# Patient Record
Sex: MALE | Race: OTHER | NOT HISPANIC OR LATINO
[De-identification: names, ages, dates, MRNs, and addresses within clinical notes are randomized per-mention and may not be internally consistent; named-entity substitution may affect disease eponyms.]

---

## 2019-05-29 PROBLEM — Z00.00 ENCOUNTER FOR PREVENTIVE HEALTH EXAMINATION: Status: ACTIVE | Noted: 2019-05-29

## 2019-06-13 ENCOUNTER — RECORD ABSTRACTING (OUTPATIENT)
Age: 84
End: 2019-06-13

## 2019-06-13 DIAGNOSIS — Z92.89 PERSONAL HISTORY OF OTHER MEDICAL TREATMENT: ICD-10-CM

## 2019-06-13 DIAGNOSIS — Z87.891 PERSONAL HISTORY OF NICOTINE DEPENDENCE: ICD-10-CM

## 2019-06-18 ENCOUNTER — APPOINTMENT (OUTPATIENT)
Dept: CARDIOLOGY | Facility: CLINIC | Age: 84
End: 2019-06-18

## 2019-08-09 ENCOUNTER — APPOINTMENT (OUTPATIENT)
Dept: CARDIOLOGY | Facility: CLINIC | Age: 84
End: 2019-08-09
Payer: MEDICARE

## 2019-08-09 VITALS
SYSTOLIC BLOOD PRESSURE: 164 MMHG | HEIGHT: 66 IN | HEART RATE: 77 BPM | BODY MASS INDEX: 25.23 KG/M2 | WEIGHT: 157 LBS | DIASTOLIC BLOOD PRESSURE: 78 MMHG

## 2019-08-09 DIAGNOSIS — Z86.73 PERSONAL HISTORY OF TRANSIENT ISCHEMIC ATTACK (TIA), AND CEREBRAL INFARCTION W/OUT RESIDUAL DEFICITS: ICD-10-CM

## 2019-08-09 PROCEDURE — 99214 OFFICE O/P EST MOD 30 MIN: CPT

## 2019-08-09 PROCEDURE — 93000 ELECTROCARDIOGRAM COMPLETE: CPT

## 2019-08-09 NOTE — ASSESSMENT
[FreeTextEntry1] : s/p CVA - recovering, undergoing PT\par CAD s/p CABG.\par S/p PCI of RCA (REUBEN).\par COPD\par

## 2019-08-09 NOTE — REVIEW OF SYSTEMS
[Feeling Fatigued] : feeling fatigued [Eyeglasses] : currently wearing eyeglasses [Urinary Frequency] : urinary frequency [Joint Pain] : joint pain [see HPI] : see HPI [Negative] : Heme/Lymph

## 2019-08-09 NOTE — REASON FOR VISIT
[Coronary Artery Disease] : coronary artery disease [Follow-Up - From Hospitalization] : follow-up of a recent hospitalization for

## 2019-08-09 NOTE — HISTORY OF PRESENT ILLNESS
[FreeTextEntry1] : The patient presents for a follow-up. he was recently admitted to a hospital in [place] with a CVA. No TPA was given. Patient was treated with increased dose of ASA, was also given Coreg 6.25 mg. He was dehydrated on labs, so HCTZ was held. BP is still uncontrolled. No chest pain since the last visit.  He had a stress test last year, which was negative for ischemia. Following-up with Dr. Sawyer for bladder CA.\par \par CAD, s/p CABG, PCI\par HTN\par CVA\par dyslipidemia\par COPD\par asbestosis\par PCI of RCA with REUBEN\par Bladder CA

## 2019-08-09 NOTE — DISCUSSION/SUMMARY
[FreeTextEntry1] : BP - uncontrolled.\par Add amlodipine. \par ECG unchanged.\par Stress test noted. Echo in the hospital - normal EF.\par C/w current medications otherwise. If more edema, will restart HCTZ. \par F/u with pulmonary for COPD, asbestosis.\par F/u with Dr. Sawyer for bladder CA management.\par F/u with Dr. Burciaga.\par Return in 3 weeks or if worsening symptoms.

## 2019-08-09 NOTE — PHYSICAL EXAM
[General Appearance - Well Developed] : well developed [General Appearance - In No Acute Distress] : no acute distress [General Appearance - Well Nourished] : well nourished [Normal Conjunctiva] : the conjunctiva exhibited no abnormalities [Normal Oral Mucosa] : normal oral mucosa [Normal Oropharynx] : normal oropharynx [Respiration, Rhythm And Depth] : normal respiratory rhythm and effort [Heart Rate And Rhythm] : heart rate and rhythm were normal [Auscultation Breath Sounds / Voice Sounds] : lungs were clear to auscultation bilaterally [Arterial Pulses Normal] : the arterial pulses were normal [Heart Sounds] : normal S1 and S2 [Bowel Sounds] : normal bowel sounds [Abdomen Soft] : soft [Abdomen Tenderness] : non-tender [FreeTextEntry1] : walks with a cane [Nail Clubbing] : no clubbing of the fingernails [Petechial Hemorrhages (___cm)] : no petechial hemorrhages [Cyanosis, Localized] : no localized cyanosis [] : no rash [Skin Color & Pigmentation] : normal skin color and pigmentation [Affect] : the affect was normal [Oriented To Time, Place, And Person] : oriented to person, place, and time

## 2019-08-16 ENCOUNTER — APPOINTMENT (OUTPATIENT)
Dept: CARDIOLOGY | Facility: CLINIC | Age: 84
End: 2019-08-16
Payer: MEDICARE

## 2019-08-16 VITALS
WEIGHT: 152 LBS | SYSTOLIC BLOOD PRESSURE: 130 MMHG | HEIGHT: 68 IN | BODY MASS INDEX: 23.04 KG/M2 | DIASTOLIC BLOOD PRESSURE: 54 MMHG | HEART RATE: 51 BPM

## 2019-08-16 PROCEDURE — 99214 OFFICE O/P EST MOD 30 MIN: CPT

## 2019-08-16 PROCEDURE — 93000 ELECTROCARDIOGRAM COMPLETE: CPT

## 2019-08-16 RX ORDER — RANOLAZINE 500 MG/1
500 TABLET, FILM COATED, EXTENDED RELEASE ORAL DAILY
Refills: 0 | Status: ACTIVE | COMMUNITY

## 2019-08-16 NOTE — PHYSICAL EXAM
[General Appearance - Well Nourished] : well nourished [General Appearance - Well Developed] : well developed [General Appearance - In No Acute Distress] : no acute distress [Normal Conjunctiva] : the conjunctiva exhibited no abnormalities [Normal Oral Mucosa] : normal oral mucosa [Normal Oropharynx] : normal oropharynx [Respiration, Rhythm And Depth] : normal respiratory rhythm and effort [Auscultation Breath Sounds / Voice Sounds] : lungs were clear to auscultation bilaterally [Heart Rate And Rhythm] : heart rate and rhythm were normal [Arterial Pulses Normal] : the arterial pulses were normal [Heart Sounds] : normal S1 and S2 [Bowel Sounds] : normal bowel sounds [Abdomen Soft] : soft [Abdomen Tenderness] : non-tender [FreeTextEntry1] : walks with a cane [Nail Clubbing] : no clubbing of the fingernails [Cyanosis, Localized] : no localized cyanosis [Petechial Hemorrhages (___cm)] : no petechial hemorrhages [] : no rash [Skin Color & Pigmentation] : normal skin color and pigmentation [Oriented To Time, Place, And Person] : oriented to person, place, and time [Affect] : the affect was normal

## 2019-08-16 NOTE — HISTORY OF PRESENT ILLNESS
[FreeTextEntry1] : The patient presents for a follow-up. His BP is still elevtaed, despite increase in amlodipine. he also reports worsening leg edema. He was recently admitted to a hospital in [place] with a CVA. No TPA was given. Patient was treated with increased dose of ASA, was also given Coreg 6.25 mg. He was dehydrated on labs, so HCTZ was held. BP is still uncontrolled. No chest pain since the last visit.  He had a stress test last year, which was negative for ischemia. Following-up with Dr. Sawyer for bladder CA.\par \par CAD, s/p CABG, PCI\par HTN\par CVA\par dyslipidemia\par COPD\par asbestosis\par PCI of RCA with REUBEN\par Bladder CA

## 2019-08-16 NOTE — DISCUSSION/SUMMARY
[FreeTextEntry1] : BP - uncontrolled.\par d/c amlodipine. Increase lisinopril  to 20/12.5 BID (total dose of 40/25)\par Will use PRN hydralazine if SBP above 150\par ECG unchanged.\par Stress test noted. Echo in the hospital - normal EF.\par C/w current medications otherwise.  will restart HCTZ. \par F/u with pulmonary for COPD, asbestosis.\par F/u with Dr. Sawyer for bladder CA management.\par F/u with Dr. Burciaga.\par Return in 3 weeks or if worsening symptoms.

## 2019-09-06 ENCOUNTER — APPOINTMENT (OUTPATIENT)
Dept: CARDIOLOGY | Facility: CLINIC | Age: 84
End: 2019-09-06
Payer: MEDICARE

## 2019-09-06 VITALS
DIASTOLIC BLOOD PRESSURE: 72 MMHG | HEIGHT: 66 IN | HEART RATE: 59 BPM | WEIGHT: 160 LBS | SYSTOLIC BLOOD PRESSURE: 134 MMHG | BODY MASS INDEX: 25.71 KG/M2

## 2019-09-06 PROCEDURE — 99213 OFFICE O/P EST LOW 20 MIN: CPT

## 2019-09-06 PROCEDURE — 93000 ELECTROCARDIOGRAM COMPLETE: CPT

## 2019-09-06 NOTE — HISTORY OF PRESENT ILLNESS
[FreeTextEntry1] : The patient presents for a follow-up. His BP is now controlled. He was recently admitted to a hospital in [place] with a CVA. No TPA was given. Patient was treated with increased dose of ASA, was also given Coreg 6.25 mg. He was dehydrated on labs, so HCTZ was held.  No chest pain since the last visit.  He had a stress test last year, which was negative for ischemia. Following-up with Dr. Sawyer for bladder CA.\par \par CAD, s/p CABG, PCI\par HTN\par CVA\par dyslipidemia\par COPD\par asbestosis\par PCI of RCA with REUBEN\par Bladder CA

## 2019-09-06 NOTE — PHYSICAL EXAM
[General Appearance - Well Developed] : well developed [General Appearance - In No Acute Distress] : no acute distress [General Appearance - Well Nourished] : well nourished [Normal Conjunctiva] : the conjunctiva exhibited no abnormalities [Normal Oral Mucosa] : normal oral mucosa [Normal Oropharynx] : normal oropharynx [Respiration, Rhythm And Depth] : normal respiratory rhythm and effort [Auscultation Breath Sounds / Voice Sounds] : lungs were clear to auscultation bilaterally [Heart Sounds] : normal S1 and S2 [Heart Rate And Rhythm] : heart rate and rhythm were normal [Arterial Pulses Normal] : the arterial pulses were normal [Abdomen Soft] : soft [Bowel Sounds] : normal bowel sounds [FreeTextEntry1] : walks with a cane [Nail Clubbing] : no clubbing of the fingernails [Abdomen Tenderness] : non-tender [Petechial Hemorrhages (___cm)] : no petechial hemorrhages [Cyanosis, Localized] : no localized cyanosis [] : no rash [Oriented To Time, Place, And Person] : oriented to person, place, and time [Skin Color & Pigmentation] : normal skin color and pigmentation [Affect] : the affect was normal

## 2019-09-06 NOTE — DISCUSSION/SUMMARY
[FreeTextEntry1] : BP - controlled.\par d/c'd amlodipine last week. Increased lisinopril  to 20/12.5 BID (total dose of 40/25)\par Will use PRN hydralazine if SBP above 150\par ECG unchanged.\par Stress test noted. Echo in the hospital - normal EF.\par C/w current medications otherwise.  \par F/u with pulmonary for COPD, asbestosis.\par F/u with Dr. Sawyer for bladder CA management.\par F/u with Dr. Burciaga.\par Return in 3 months or if worsening symptoms.

## 2019-09-06 NOTE — REVIEW OF SYSTEMS
[Eyeglasses] : currently wearing eyeglasses [Feeling Fatigued] : feeling fatigued [Urinary Frequency] : urinary frequency [see HPI] : see HPI [Joint Pain] : joint pain [Negative] : Endocrine

## 2019-09-12 ENCOUNTER — RX RENEWAL (OUTPATIENT)
Age: 84
End: 2019-09-12

## 2019-11-01 ENCOUNTER — APPOINTMENT (OUTPATIENT)
Dept: CARDIOLOGY | Facility: CLINIC | Age: 84
End: 2019-11-01
Payer: MEDICARE

## 2019-11-01 VITALS
SYSTOLIC BLOOD PRESSURE: 159 MMHG | DIASTOLIC BLOOD PRESSURE: 70 MMHG | HEART RATE: 50 BPM | BODY MASS INDEX: 27.12 KG/M2 | WEIGHT: 168 LBS

## 2019-11-01 PROCEDURE — 93000 ELECTROCARDIOGRAM COMPLETE: CPT

## 2019-11-01 PROCEDURE — 99213 OFFICE O/P EST LOW 20 MIN: CPT

## 2019-11-01 NOTE — PHYSICAL EXAM
[General Appearance - Well Developed] : well developed [General Appearance - Well Nourished] : well nourished [General Appearance - In No Acute Distress] : no acute distress [Normal Conjunctiva] : the conjunctiva exhibited no abnormalities [Normal Oral Mucosa] : normal oral mucosa [Normal Oropharynx] : normal oropharynx [Respiration, Rhythm And Depth] : normal respiratory rhythm and effort [Auscultation Breath Sounds / Voice Sounds] : lungs were clear to auscultation bilaterally [Heart Rate And Rhythm] : heart rate and rhythm were normal [Heart Sounds] : normal S1 and S2 [Arterial Pulses Normal] : the arterial pulses were normal [Bowel Sounds] : normal bowel sounds [Abdomen Soft] : soft [Abdomen Tenderness] : non-tender [FreeTextEntry1] : walks with a cane [Nail Clubbing] : no clubbing of the fingernails [Cyanosis, Localized] : no localized cyanosis [Petechial Hemorrhages (___cm)] : no petechial hemorrhages [Skin Color & Pigmentation] : normal skin color and pigmentation [] : no rash [Oriented To Time, Place, And Person] : oriented to person, place, and time [Affect] : the affect was normal

## 2020-02-19 ENCOUNTER — RESULT CHARGE (OUTPATIENT)
Age: 85
End: 2020-02-19

## 2020-02-19 ENCOUNTER — APPOINTMENT (OUTPATIENT)
Dept: CARDIOLOGY | Facility: CLINIC | Age: 85
End: 2020-02-19
Payer: MEDICARE

## 2020-02-19 VITALS — HEART RATE: 62 BPM | SYSTOLIC BLOOD PRESSURE: 138 MMHG | DIASTOLIC BLOOD PRESSURE: 68 MMHG

## 2020-02-19 PROCEDURE — 99214 OFFICE O/P EST MOD 30 MIN: CPT

## 2020-02-19 PROCEDURE — 93000 ELECTROCARDIOGRAM COMPLETE: CPT

## 2020-02-19 RX ORDER — ACETAMINOPHEN 325 MG/1
325 TABLET ORAL
Refills: 0 | Status: ACTIVE | COMMUNITY

## 2020-02-19 RX ORDER — ASPIRIN 325 MG/1
325 TABLET, FILM COATED ORAL DAILY
Qty: 30 | Refills: 0 | Status: ACTIVE | COMMUNITY
Start: 2020-02-19

## 2020-02-19 RX ORDER — ATORVASTATIN CALCIUM 40 MG/1
40 TABLET, FILM COATED ORAL
Qty: 30 | Refills: 5 | Status: DISCONTINUED | COMMUNITY
End: 2020-02-19

## 2020-02-19 NOTE — ASSESSMENT
[FreeTextEntry1] : s/p CVA - recovering,\par CAD s/p CABG.\par S/p PCI of RCA (REUBEN).\par COPD\par

## 2020-02-19 NOTE — HISTORY OF PRESENT ILLNESS
[FreeTextEntry1] : The patient presents for a follow-up of his chronic conditions. His BP is now controlled. He had a CVA in July 2019, ASA  was increased to 325. He was also given Coreg 6.25 mg. He was dehydrated on labs, so HCTZ was held.  No chest pain since the last visit.  He had a stress test last year, which was negative for ischemia. Following-up with Dr. Sawyer for bladder CA. He developed severe myalgia with statin, stopped.\par \par CAD, s/p CABG, PCI\par HTN\par CVA\par dyslipidemia\par COPD\par asbestosis\par PCI of RCA with REUBEN\par Bladder CA

## 2020-02-19 NOTE — PHYSICAL EXAM
[General Appearance - Well Nourished] : well nourished [General Appearance - Well Developed] : well developed [General Appearance - In No Acute Distress] : no acute distress [Normal Conjunctiva] : the conjunctiva exhibited no abnormalities [Normal Oral Mucosa] : normal oral mucosa [Normal Oropharynx] : normal oropharynx [Respiration, Rhythm And Depth] : normal respiratory rhythm and effort [Auscultation Breath Sounds / Voice Sounds] : lungs were clear to auscultation bilaterally [Heart Rate And Rhythm] : heart rate and rhythm were normal [Heart Sounds] : normal S1 and S2 [Arterial Pulses Normal] : the arterial pulses were normal [Bowel Sounds] : normal bowel sounds [Abdomen Soft] : soft [Abdomen Tenderness] : non-tender [FreeTextEntry1] : walks with a cane [Nail Clubbing] : no clubbing of the fingernails [Cyanosis, Localized] : no localized cyanosis [Petechial Hemorrhages (___cm)] : no petechial hemorrhages [Skin Color & Pigmentation] : normal skin color and pigmentation [] : no rash [Oriented To Time, Place, And Person] : oriented to person, place, and time [Affect] : the affect was normal

## 2020-02-19 NOTE — DISCUSSION/SUMMARY
[FreeTextEntry1] : BP - controlled.\par d/c'd amlodipine last week. Increased lisinopril  to 20/12.5 BID (total dose of 40/25)\par Will use PRN hydralazine if SBP above 150\par ECG unchanged.\par Stress test noted. Echo in the hospital - normal EF.\par Stopped statin due to myalgia.\par Will repeat lipid levels in 4-6 weeks with PMD to assess the need for additional therapy.\par C/w current medications otherwise.  \par F/u with pulmonary for COPD, asbestosis.\par F/u with Dr. Sawyer for bladder CA management.\par F/u with Dr. Dillard.\par Return in 3 months or if worsening symptoms.

## 2020-03-01 ENCOUNTER — RX RENEWAL (OUTPATIENT)
Age: 85
End: 2020-03-01

## 2020-03-01 RX ORDER — HYDRALAZINE HYDROCHLORIDE 25 MG/1
25 TABLET ORAL 3 TIMES DAILY
Qty: 90 | Refills: 3 | Status: ACTIVE | COMMUNITY
Start: 2019-08-16 | End: 1900-01-01

## 2020-04-29 ENCOUNTER — APPOINTMENT (OUTPATIENT)
Dept: CARDIOLOGY | Facility: CLINIC | Age: 85
End: 2020-04-29
Payer: MEDICARE

## 2020-04-29 PROCEDURE — 99442: CPT | Mod: CR

## 2020-05-01 ENCOUNTER — APPOINTMENT (OUTPATIENT)
Dept: CARDIOLOGY | Facility: CLINIC | Age: 85
End: 2020-05-01
Payer: MEDICARE

## 2020-05-01 VITALS
BODY MASS INDEX: 25.82 KG/M2 | HEART RATE: 80 BPM | WEIGHT: 160 LBS | SYSTOLIC BLOOD PRESSURE: 140 MMHG | DIASTOLIC BLOOD PRESSURE: 80 MMHG

## 2020-05-01 PROCEDURE — 99214 OFFICE O/P EST MOD 30 MIN: CPT

## 2020-05-01 PROCEDURE — 93000 ELECTROCARDIOGRAM COMPLETE: CPT

## 2020-05-01 RX ORDER — HYDRALAZINE HYDROCHLORIDE 50 MG/1
50 TABLET ORAL 3 TIMES DAILY
Qty: 90 | Refills: 3 | Status: ACTIVE | COMMUNITY
Start: 2020-05-01 | End: 1900-01-01

## 2020-05-01 RX ORDER — AZITHROMYCIN 250 MG/1
250 TABLET, FILM COATED ORAL
Qty: 1 | Refills: 0 | Status: ACTIVE | COMMUNITY
Start: 2020-05-01 | End: 1900-01-01

## 2020-05-01 RX ORDER — AMLODIPINE BESYLATE 5 MG/1
5 TABLET ORAL DAILY
Qty: 90 | Refills: 3 | Status: COMPLETED | COMMUNITY
End: 2020-05-01

## 2020-05-01 NOTE — HISTORY OF PRESENT ILLNESS
[FreeTextEntry1] : The patient presents for a follow-up. His BP has been elevated at home, to as high as 190's. It is now controlled. He was recently admitted to a hospital in [place] with a CVA. Patient was treated with increased dose of ASA, was also given Coreg 6.25 mg. He was dehydrated on labs, so HCTZ was held.  No chest pain since the last visit.  He had a stress test last year, which was negative for ischemia. Following-up with Dr. Sawyer for bladder CA. he stopped Coreg. Could not tolerate Norvasc due to edema. Recen UTI. His lungs were worse recently and after discussion with his pulmonologist he was started on tapering course of prednisone. He is on 20 mg a day now. + edema.\par \par CAD, s/p CABG, PCI\par HTN\par CVA\par dyslipidemia\par COPD\par asbestosis\par PCI of RCA with REUBEN\par Bladder CA

## 2020-05-01 NOTE — ASSESSMENT
[FreeTextEntry1] : s/p CVA - recovering,\par CAD s/p CABG.\par S/p PCI of RCA (REUBEN).\par COPD\par BP uncontrolled - increase hydralazine to 50 mg. Restart Coreg once COPD exacerbation is better.\par Complete steroid course - f/u with pulmonary.\par F/u with Dr. Sawyer for Bladder CA.\par Will give a course of Abx due to suspected COPD exacerbation/bronchitis.\par C/w ACE-I, HCTZ.\par F/u in 4 weeks.\par

## 2020-05-01 NOTE — PHYSICAL EXAM
[Normal Appearance] : normal appearance [General Appearance - Well Developed] : well developed [General Appearance - Well Nourished] : well nourished [Well Groomed] : well groomed [General Appearance - In No Acute Distress] : no acute distress [Normal Conjunctiva] : the conjunctiva exhibited no abnormalities [Eyelids - No Xanthelasma] : the eyelids demonstrated no xanthelasmas [Normal Oral Mucosa] : normal oral mucosa [Normal Oropharynx] : normal oropharynx [Respiration, Rhythm And Depth] : normal respiratory rhythm and effort [Heart Rate And Rhythm] : heart rate and rhythm were normal [Arterial Pulses Normal] : the arterial pulses were normal [Heart Sounds] : normal S1 and S2 [Bowel Sounds] : normal bowel sounds [Abdomen Tenderness] : non-tender [Abdomen Soft] : soft [FreeTextEntry1] : walks with a cane [Nail Clubbing] : no clubbing of the fingernails [Cyanosis, Localized] : no localized cyanosis [Skin Color & Pigmentation] : normal skin color and pigmentation [] : no rash [Petechial Hemorrhages (___cm)] : no petechial hemorrhages [Oriented To Time, Place, And Person] : oriented to person, place, and time [Affect] : the affect was normal

## 2020-05-15 RX ORDER — ISOSORBIDE MONONITRATE 60 MG/1
60 TABLET, EXTENDED RELEASE ORAL DAILY
Qty: 90 | Refills: 3 | Status: ACTIVE | COMMUNITY
Start: 1900-01-01 | End: 1900-01-01

## 2020-09-04 ENCOUNTER — RX RENEWAL (OUTPATIENT)
Age: 85
End: 2020-09-04

## 2020-09-04 RX ORDER — CARVEDILOL 6.25 MG/1
6.25 TABLET, FILM COATED ORAL TWICE DAILY
Qty: 180 | Refills: 3 | Status: ACTIVE | COMMUNITY
Start: 2020-09-04 | End: 1900-01-01

## 2020-12-01 ENCOUNTER — RX RENEWAL (OUTPATIENT)
Age: 85
End: 2020-12-01

## 2021-01-20 ENCOUNTER — APPOINTMENT (OUTPATIENT)
Dept: CARDIOLOGY | Facility: CLINIC | Age: 86
End: 2021-01-20
Payer: MEDICARE

## 2021-01-20 VITALS
WEIGHT: 135 LBS | TEMPERATURE: 77.3 F | DIASTOLIC BLOOD PRESSURE: 90 MMHG | HEART RATE: 73 BPM | BODY MASS INDEX: 21.69 KG/M2 | SYSTOLIC BLOOD PRESSURE: 200 MMHG | HEIGHT: 66 IN

## 2021-01-20 DIAGNOSIS — J44.9 CHRONIC OBSTRUCTIVE PULMONARY DISEASE, UNSPECIFIED: ICD-10-CM

## 2021-01-20 DIAGNOSIS — E78.5 HYPERLIPIDEMIA, UNSPECIFIED: ICD-10-CM

## 2021-01-20 DIAGNOSIS — I25.10 ATHEROSCLEROTIC HEART DISEASE OF NATIVE CORONARY ARTERY W/OUT ANGINA PECTORIS: ICD-10-CM

## 2021-01-20 DIAGNOSIS — Z01.810 ENCOUNTER FOR PREPROCEDURAL CARDIOVASCULAR EXAMINATION: ICD-10-CM

## 2021-01-20 DIAGNOSIS — I10 ESSENTIAL (PRIMARY) HYPERTENSION: ICD-10-CM

## 2021-01-20 PROCEDURE — 93000 ELECTROCARDIOGRAM COMPLETE: CPT

## 2021-01-20 PROCEDURE — 99214 OFFICE O/P EST MOD 30 MIN: CPT

## 2021-01-20 NOTE — PHYSICAL EXAM
[General Appearance - Well Developed] : well developed [General Appearance - Well Nourished] : well nourished [General Appearance - In No Acute Distress] : no acute distress [Normal Conjunctiva] : the conjunctiva exhibited no abnormalities [Respiration, Rhythm And Depth] : normal respiratory rhythm and effort [Auscultation Breath Sounds / Voice Sounds] : lungs were clear to auscultation bilaterally [Heart Rate And Rhythm] : heart rate and rhythm were normal [Heart Sounds] : normal S1 and S2 [Arterial Pulses Normal] : the arterial pulses were normal [Bowel Sounds] : normal bowel sounds [Abdomen Soft] : soft [Abdomen Tenderness] : non-tender [Nail Clubbing] : no clubbing of the fingernails [FreeTextEntry1] : walks with a cane [Cyanosis, Localized] : no localized cyanosis [Petechial Hemorrhages (___cm)] : no petechial hemorrhages [Skin Color & Pigmentation] : normal skin color and pigmentation [] : no rash [Oriented To Time, Place, And Person] : oriented to person, place, and time [Affect] : the affect was normal

## 2021-01-20 NOTE — HISTORY OF PRESENT ILLNESS
[FreeTextEntry1] : The patient presents for a follow-up of his chronic conditions. His BP is now controlled. He had a CVA in July 2019, ASA  was increased to 325. He was also given Coreg 6.25 mg. He was dehydrated on labs, so HCTZ was held.  No chest pain since the last visit.  He had a stress test last year, which was negative for ischemia. Following-up with Dr. Sawyer for bladder CA. He developed severe myalgia with statin, stopped. Needs clearance for cystoscopy. Stable dyspnea. + cough.\par \par CAD, s/p CABG, PCI\par HTN\par CVA\par dyslipidemia\par COPD\par asbestosis\par PCI of RCA with REUBEN\par Bladder CA

## 2021-01-20 NOTE — DISCUSSION/SUMMARY
[FreeTextEntry1] : BP - controlled, but some episodes of elevated BP.\par Increased lisinopril  to 20/12.5 BID (total dose of 40/25)\par Will use PRN hydralazine if SBP above 150\par ECG unchanged.\par Stress test noted. Echo in the hospital - normal EF.\par Stopped statin due to myalgia.\par Will repeat lipid levels in 4-6 weeks.\par C/w current medications otherwise.  \par F/u with pulmonary for COPD, asbestosis.\par F/u with Dr. Sawyer for bladder CA management. Will clear for this procedure at intermediate risk.\par F/u with Dr. Dillard.\par Return in 3 months or if worsening symptoms.

## 2021-02-03 ENCOUNTER — RX RENEWAL (OUTPATIENT)
Age: 86
End: 2021-02-03

## 2021-10-06 PROBLEM — I10 ESSENTIAL HYPERTENSION: Status: ACTIVE | Noted: 2019-06-13

## 2021-10-11 ENCOUNTER — RX RENEWAL (OUTPATIENT)
Age: 86
End: 2021-10-11

## 2021-10-11 RX ORDER — LISINOPRIL AND HYDROCHLOROTHIAZIDE TABLETS 20; 12.5 MG/1; MG/1
20-12.5 TABLET ORAL TWICE DAILY
Qty: 180 | Refills: 1 | Status: ACTIVE | COMMUNITY
Start: 2020-12-01 | End: 1900-01-01